# Patient Record
(demographics unavailable — no encounter records)

---

## 2024-10-10 NOTE — PHYSICAL EXAM
[Well Nourished] : well nourished [PERRL] : pupils equal round and reactive to light [Normal Oropharynx] : the oropharynx was normal [No JVD] : no jugular venous distention [Supple] : supple [Clear to Auscultation] : lungs were clear to auscultation bilaterally [No Accessory Muscle Use] : no accessory muscle use [Regular Rhythm] : with a regular rhythm [Normal S1, S2] : normal S1 and S2 [No Extremity Clubbing/Cyanosis] : no extremity clubbing/cyanosis [Soft] : abdomen soft [Normal Bowel Sounds] : normal bowel sounds [Normal Supraclavicular Nodes] : no supraclavicular lymphadenopathy [Normal Posterior Cervical Nodes] : no posterior cervical lymphadenopathy [Normal Anterior Cervical Nodes] : no anterior cervical lymphadenopathy [No CVA Tenderness] : no CVA  tenderness [No Rash] : no rash [Well Developed] : well developed [Well-Appearing] : well-appearing [de-identified] : There is a 1/6 systolic ejection murmur at the left sternal border [de-identified] : There is chronic edema of the lower extremities [de-identified] : She is primarily wheelchair-bound at this time.  Cognitive issues are demonstrated. [de-identified] : Her judgment is not normal at this time

## 2024-10-10 NOTE — ADDENDUM
[FreeTextEntry1] : This note was written by Thelma Damon on 10/10/2024 acting as a medical scribe for Dr. Raymond Becerra MD. All medical entries made by the scribe were at my, Dr. Raymond Becerra's, direction and personally dictated by me on 10/10/2024. I have reviewed the chart and agree that the record accurately reflects my personal performance of the history, review of systems, assessment, and plan. I have also personally directed, reviewed, and agreed with the chart.

## 2024-10-10 NOTE — PLAN
[FreeTextEntry1] : 1. Continue current medications as outlined above.  2. The patient received the high-dose influenza vaccine today, 10/10/24.    3. Follow up in 6 months with wellness, routine fasting bloodwork, and EKG.   4. Continue to follow with cardiologist, Dr. Terrell, for monitoring of HTN and HLD.   5. Continue to follow with Dr. Montalvo regarding treatment of vulvodynia.   6. The COVID vaccine is recommended in the fall.   7. Cardiovascular exercise as tolerated.

## 2024-10-10 NOTE — HISTORY OF PRESENT ILLNESS
[Spouse] : spouse [FreeTextEntry1] :  The patient comes in for a routine follow-up evaluation. [de-identified] : The patient is feeling well at this time. The patient has a history of HTN for which she is maintained on Amlodipine 10 MG once daily, Lisinopril 20 MG once daily, and Potassium Chloride 10 MEQ BID. She also has a history of Hyperlipidemia and continues to take Simvastatin 40 MG once daily and Ezetimibe 10 MG once daily. She is generally tolerating the statin agent well, denying any severe muscle aches or any other overt symptoms. She follows with cardiologist, Dr. Terrell. There has been no chest pain, shortness of breath, palpitations, or PND.  She has a history of dementia for which she is maintained on Donepezil 10 Mg once daily and Memantine 21 MG once daily. Her  reports that her memory does continue to worsen. The patient reports that her appetite is good. She stays active by gardening at her assisted living home.   The patient has a history of vulvodynia, for which she follows with Dr. Montalvo. She does not report any issues in this regard. She does take Gabapentin 100 MG BID, and her  states that she tolerates the medication well.   The patient continues to have issues with chronic back pain, for which she uses Tylenol 325 MG, 2 tabs PRN and Lidocaine patches. She states that her back pain is typically well controlled. There have been no cough, fevers, chills, or night sweats. There have been no other acute constitutional symptoms. She comes in for this assessment.

## 2025-01-30 NOTE — HISTORY OF PRESENT ILLNESS
[8] : 8 [6] : 6 [Dull/Aching] : dull/aching [Localized] : localized [Constant] : constant [Standing] : standing [Walking] : walking [Stairs] : stairs [Retired] : Work status: retired [] : Post Surgical Visit: no [FreeTextEntry5] : 81 y/o F presents for f/u eval today. Previous tx CSI some relief. Pt notes pain levels remain the same.

## 2025-01-30 NOTE — ASSESSMENT
[FreeTextEntry1] : The patient comes in today for follow-up on her left knee.  She continues to have pain in her left knee consistent with her osteoarthritis.  The patient has progressive dementia.  She spends most of her days in a wheelchair.  She does, however get physical therapy twice a week and she has pain in her knee when attempting ambulation.  I gave her a cortisone injection back in August which helped for a few weeks.  The pain is now come back.  She is not a surgical candidate.  Examination of the left lower extremity reveals normal neurovascular exam.  Examination left knee reveals severe limitation of range of motion from 10 to 100 degrees with pain at the extremes.  There is a trace effusion.  There is diffuse joint line pain with no instability.  There is no Clarisa's sign.  Patella tracking is normal with no crepitation or apprehension.  Under sterile conditions the left knee was injected with 5 cc of quarter percent plain Marcaine; 5 cc of 2% plain lidocaine and 80 mg of Depo-Medrol.  Patient tolerated the procedure well.  The plan at this time is ice and activity modification I will see her back in the office in 3 months.  We will continue giving cortisone injections every 3 months.  Gel injections have not been helpful for her in the past. No

## 2025-01-30 NOTE — HISTORY OF PRESENT ILLNESS
[8] : 8 [6] : 6 [Dull/Aching] : dull/aching [Localized] : localized [Constant] : constant [Standing] : standing [Walking] : walking [Stairs] : stairs [Retired] : Work status: retired [] : Post Surgical Visit: no [FreeTextEntry5] : 79 y/o F presents for f/u eval today. Previous tx CSI some relief. Pt notes pain levels remain the same.

## 2025-01-30 NOTE — ASSESSMENT
[FreeTextEntry1] : The patient comes in today for follow-up on her left knee.  She continues to have pain in her left knee consistent with her osteoarthritis.  The patient has progressive dementia.  She spends most of her days in a wheelchair.  She does, however get physical therapy twice a week and she has pain in her knee when attempting ambulation.  I gave her a cortisone injection back in August which helped for a few weeks.  The pain is now come back.  She is not a surgical candidate.  Examination of the left lower extremity reveals normal neurovascular exam.  Examination left knee reveals severe limitation of range of motion from 10 to 100 degrees with pain at the extremes.  There is a trace effusion.  There is diffuse joint line pain with no instability.  There is no Clarisa's sign.  Patella tracking is normal with no crepitation or apprehension.  Under sterile conditions the left knee was injected with 5 cc of quarter percent plain Marcaine; 5 cc of 2% plain lidocaine and 80 mg of Depo-Medrol.  Patient tolerated the procedure well.  The plan at this time is ice and activity modification I will see her back in the office in 3 months.  We will continue giving cortisone injections every 3 months.  Gel injections have not been helpful for her in the past.

## 2025-04-25 NOTE — HEALTH RISK ASSESSMENT
[Never (0 pts)] : Never (0 points) [No] : In the past 12 months have you used drugs other than those required for medical reasons? No [Two or more falls in past year] : Patient reported two or more falls in the past year [1] : 1) Little interest or pleasure doing things for several days (1) [0] : 2) Feeling down, depressed, or hopeless: Not at all (0) [PHQ-2 Negative - No further assessment needed] : PHQ-2 Negative - No further assessment needed [Never] : Never [NO] : No [Smoke Detector] : smoke detector [Carbon Monoxide Detector] : carbon monoxide detector [Safety elements used in home] : safety elements used in home [Change in mental status noted] : Change in mental status noted [Language] : difficulty with language [Behavior] : difficulty with behavior [Learning/Retaining New Information] : difficulty learning/retaining new information [Handling Complex Tasks] : difficulty handling complex tasks [Reasoning] : difficulty with reasoning [Spatial Ability and Orientation] : difficulty with spatial ability and orientation [Financial] : financial [Access to Medications] : access to medications [Transportation] : transportation [Food] : food [Employment] : employment [Housing] : housing [Behavioral] : behavioral [Health Literacy] : health literacy [Utilities] : utilities

## 2025-04-25 NOTE — PLAN
[FreeTextEntry1] : 1. Continue current medications as outlined above.   2. Follow up in 6 months with office visit and BP check   3. Continue to follow with cardiologist, Dr. Terrell, for monitoring of HTN and HLD.   4. Continue to follow with Dr. Montalvo regarding treatment of vulvodynia.   5. The Influenza and COVID vaccines are recommended in the fall.   6. Cardiovascular exercise as tolerated.  7.  I will await the most recent blood work which was performed at her assisted living facility.  I will also await an updated medication list for my review.

## 2025-04-25 NOTE — CURRENT MEDS
[Takes medication as prescribed] : takes [Lack of understanding] : lack of understanding [Yes] : Reviewed medication list for presence of high-risk medications.

## 2025-04-25 NOTE — REVIEW OF SYSTEMS
[Back Pain] : back pain [Memory Loss] : memory loss [Negative] : Heme/Lymph [Skin Rash] : skin rash [FreeTextEntry9] : see hpi [de-identified] : see hpi

## 2025-04-25 NOTE — ADDENDUM
[FreeTextEntry1] : This note was written by Thelma Damon on 04/25/2025 acting as a medical scribe for Dr. Raymond Becerra MD. All medical entries made by the scribe were at my, Dr. Raymond Becerra's, direction and personally dictated by me on 04/25/2025. I have reviewed the chart and agree that the record accurately reflects my personal performance of the history, review of systems, assessment, and plan. I have also personally directed, reviewed, and agreed with the chart.

## 2025-04-25 NOTE — PHYSICAL EXAM
[Well Nourished] : well nourished [Well Developed] : well developed [Well-Appearing] : well-appearing [PERRL] : pupils equal round and reactive to light [Normal Oropharynx] : the oropharynx was normal [No JVD] : no jugular venous distention [Supple] : supple [Thyroid Normal, No Nodules] : the thyroid was normal and there were no nodules present [No Respiratory Distress] : no respiratory distress  [No Accessory Muscle Use] : no accessory muscle use [Normal Rate] : normal rate  [Regular Rhythm] : with a regular rhythm [Normal S1, S2] : normal S1 and S2 [No Extremity Clubbing/Cyanosis] : no extremity clubbing/cyanosis [Soft] : abdomen soft [Non Tender] : non-tender [Non-distended] : non-distended [No HSM] : no HSM [Normal Bowel Sounds] : normal bowel sounds [Normal Supraclavicular Nodes] : no supraclavicular lymphadenopathy [Normal Posterior Cervical Nodes] : no posterior cervical lymphadenopathy [Normal Anterior Cervical Nodes] : no anterior cervical lymphadenopathy [No CVA Tenderness] : no CVA  tenderness [No Spinal Tenderness] : no spinal tenderness [No Joint Swelling] : no joint swelling [No Rash] : no rash [Normal Gait] : normal gait [No Focal Deficits] : no focal deficits [Normal Affect] : the affect was normal [Normal Insight/Judgement] : insight and judgment were intact [Clear to Auscultation] : lungs were clear to auscultation bilaterally [de-identified] : There are no wheezes or rhonchi. [de-identified] : There is a 1-2/6 systolic ejection murmur at the left sternal border [de-identified] : There is chronic lymph edema of the lower extremities [de-identified] : She is status post left mastectomy [de-identified] : As per GYN [de-identified] : There are shotty bilateral submental palpable lymph nodes [de-identified] : Seborrheic keratoses are present.  There is fungal dermatitis under the right breast [de-identified] : Mild memory loss appears to be present

## 2025-04-25 NOTE — HISTORY OF PRESENT ILLNESS
[FreeTextEntry1] :  The patient comes in for a yearly wellness evaluation. [de-identified] : The patient is feeling well at this time. The patient has a history of HTN for which she is maintained on Amlodipine 10 MG once daily, Lisinopril 20 MG once daily, and Potassium Chloride 10 MEQ BID. She also has a history of Hyperlipidemia and continues to take Simvastatin 40 MG once daily and Ezetimibe 10 MG once daily. She is generally tolerating the statin agent well, denying any severe muscle aches or any other overt symptoms. Additionally, she is maintained on Torsemide 10 MG once daily for treatment of lymphedema. She follows with cardiologist, Dr. Terrell. There has been no chest pain, shortness of breath, palpitations, or PND.  She has a history of dementia for which she is maintained on Donepezil 10 Mg once daily and Memantine 21 MG once daily. The patient reports that her appetite is good, and she has gained approximately 14 lbs since October 2024. She is now fairly sedentary on a daily basis.  Additionally, the patient has a history of depression for which she is maintained on Duloxetine 60 MG once daily. She is doing well in this regard.   The patient has a history of vulvodynia, for which she follows with Dr. Montalvo. She does not report any issues in this regard. She does take Gabapentin 100 MG BID, and her  states that she tolerates the medication well.  The patient continues to have issues with chronic back pain, for which she uses Tylenol 325 MG, 2 tabs PRN and Lidocaine patches. She states that her back pain is typically well controlled.  According to the , she now has type 2 diabetes.  Apparently she was started on medication, but he does not know the name of the medicine she is currently taking.  There have been no cough, fevers, chills, or night sweats. There have been no other acute constitutional symptoms. She comes in for this assessment.  The patient does not consume alcohol on a regular basis. She does wear a seatbelt when in a motor vehicle. There is no evidence of depression or suicidal ideations. The patient reports a very social life and denies any loneliness or isolation. She denies any difficulty carrying out daily activities such as dressing and grooming. She continues to live in an assited living home.   Last CPE: 3/22/24  GYN Exam: up to date  Mammogram: n/a  Colonoscopy: n/a  Ophthalmology: n/a  Dermatology: n/a  Dentist: n/a  Flu: due in fall  Tdap: up to date  COVID: due in fall  Diet: regular  Exercise: does not formally exercise

## 2025-05-01 NOTE — HISTORY OF PRESENT ILLNESS
[8] : 8 [6] : 6 [Dull/Aching] : dull/aching [Localized] : localized [Constant] : constant [Standing] : standing [Walking] : walking [Stairs] : stairs [Retired] : Work status: retired [] : Post Surgical Visit: no [FreeTextEntry5] : 82 y/o F presents for f/u eval today. pt is here for a CSI

## 2025-05-01 NOTE — ASSESSMENT
[FreeTextEntry1] : The patient comes in today for follow-up on her left knee.  She continues to have pain in her left knee consistent with her osteoarthritis.  The patient has progressive dementia.  She spends most of her days in a wheelchair.  She does, however get physical therapy twice a week and she has pain in her knee when attempting ambulation. She is not a surgical candidate.  Left knee exam: Neurovascularly intact. Sensation intact. Examination left knee reveals severe limitation of range of motion from 10 to 100 degrees with pain at the extremes.  There is a trace effusion.  There is diffuse joint line pain with no instability.  There is no Clarisa's sign.  Patella tracking is normal with no crepitation or apprehension.  The patient received a left knee CSI. She tolerated it well. Ultrasound was used. She will return in 3 months as needed.

## 2025-05-01 NOTE — PROCEDURE
[Large Joint Injection] : Large joint injection [Left] : of the left [Knee] : knee [Pain] : pain [Inflammation] : inflammation [X-ray evidence of Osteoarthritis on this or prior visit] : x-ray evidence of Osteoarthritis on this or prior visit [Alcohol] : alcohol [Betadine] : betadine [Ethyl Chloride sprayed topically] : ethyl chloride sprayed topically [Sterile technique used] : sterile technique used [___ cc    1%] : Lidocaine ~Vcc of 1%  [___ cc    0.25%] : Bupivacaine (Marcaine) ~Vcc of 0.25%  [___ cc    80mg] : Methylprednisolone (Depomedrol) ~Vcc of 80 mg  [] : Patient tolerated procedure well [Call if redness, pain or fever occur] : call if redness, pain or fever occur [Previous OTC use and PT nontherapeutic] : patient has tried OTC's including aspirin, Ibuprofen, Aleve, etc or prescription NSAIDS, and/or exercises at home and/or physical therapy without satisfactory response [Patient had decreased mobility in the joint] : patient had decreased mobility in the joint [Risks, benefits, alternatives discussed / Verbal consent obtained] : the risks benefits, and alternatives have been discussed, and verbal consent was obtained [Altered anatomic landmarks d/t erosive arthritis] : altered anatomic landmarks d/t erosive arthritis [All ultrasound images have been permanently captured and stored accordingly in our picture archiving and communication system] : All ultrasound images have been permanently captured and stored accordingly in our picture archiving and communication system [Visualization of the needle and placement of injection was performed without complication] : visualization of the needle and placement of injection was performed without complication

## 2025-06-03 NOTE — PROCEDURE
[Large Joint Injection] : Large joint injection [Left] : of the left [Knee] : knee [Pain] : pain [Inflammation] : inflammation [X-ray evidence of Osteoarthritis on this or prior visit] : x-ray evidence of Osteoarthritis on this or prior visit [Alcohol] : alcohol [Betadine] : betadine [Ethyl Chloride sprayed topically] : ethyl chloride sprayed topically [Sterile technique used] : sterile technique used [Call if redness, pain or fever occur] : call if redness, pain or fever occur [] : Patient tolerated procedure well [Previous OTC use and PT nontherapeutic] : patient has tried OTC's including aspirin, Ibuprofen, Aleve, etc or prescription NSAIDS, and/or exercises at home and/or physical therapy without satisfactory response [Patient had decreased mobility in the joint] : patient had decreased mobility in the joint [Risks, benefits, alternatives discussed / Verbal consent obtained] : the risks benefits, and alternatives have been discussed, and verbal consent was obtained [Synvisc-one (48mg)] : 48mg of Synvisc-one [#1] : series #1

## 2025-06-03 NOTE — ASSESSMENT
[FreeTextEntry1] : The patient is here for left knee pain secondary to her OA. She has pain with ADLs. She reports getting a CSI one month ago but is unsure of efficacy. She wishes to consider a gel injection and her  is in agreement. The patient has progressive dementia.  She spends most of her days in a wheelchair.  She does, however get physical therapy twice a week and she has pain in her knee when attempting ambulation. She is not a surgical candidate.  Left knee exam: Neurovascularly intact. Sensation intact. Examination left knee reveals severe limitation of range of motion from 10 to 100 degrees with pain at the extremes.  There is a trace effusion.  There is diffuse joint line pain with no instability.  There is no Clarisa's sign.  Patella tracking is normal with no crepitation or apprehension.  The patient received a left knee Synvisc-1 injections. She tolerated it well. The patient will return in 6 weeks as needed for possible CSI. She should return to see Dr. Larose.